# Patient Record
Sex: MALE | Race: OTHER | HISPANIC OR LATINO | URBAN - METROPOLITAN AREA
[De-identification: names, ages, dates, MRNs, and addresses within clinical notes are randomized per-mention and may not be internally consistent; named-entity substitution may affect disease eponyms.]

---

## 2023-09-26 ENCOUNTER — EMERGENCY (EMERGENCY)
Facility: HOSPITAL | Age: 18
LOS: 1 days | Discharge: HOME | End: 2023-09-26
Attending: STUDENT IN AN ORGANIZED HEALTH CARE EDUCATION/TRAINING PROGRAM
Payer: COMMERCIAL

## 2023-09-26 VITALS
RESPIRATION RATE: 18 BRPM | WEIGHT: 186.29 LBS | SYSTOLIC BLOOD PRESSURE: 118 MMHG | OXYGEN SATURATION: 99 % | TEMPERATURE: 98 F | DIASTOLIC BLOOD PRESSURE: 72 MMHG | HEIGHT: 68 IN | HEART RATE: 52 BPM

## 2023-09-26 VITALS
OXYGEN SATURATION: 100 % | DIASTOLIC BLOOD PRESSURE: 68 MMHG | HEART RATE: 68 BPM | RESPIRATION RATE: 20 BRPM | TEMPERATURE: 98 F | SYSTOLIC BLOOD PRESSURE: 118 MMHG

## 2023-09-26 PROCEDURE — 99283 EMERGENCY DEPT VISIT LOW MDM: CPT

## 2023-09-26 PROCEDURE — 73140 X-RAY EXAM OF FINGER(S): CPT | Mod: 26,RT

## 2023-09-26 PROCEDURE — 73140 X-RAY EXAM OF FINGER(S): CPT

## 2023-09-26 PROCEDURE — 99284 EMERGENCY DEPT VISIT MOD MDM: CPT | Mod: 25

## 2023-09-26 PROCEDURE — 29130 APPL FINGER SPLINT STATIC: CPT | Mod: F9

## 2023-09-26 RX ORDER — IBUPROFEN 200 MG
600 TABLET ORAL ONCE
Refills: 0 | Status: COMPLETED | OUTPATIENT
Start: 2023-09-26 | End: 2023-09-26

## 2023-09-26 RX ORDER — ACETAMINOPHEN 500 MG
650 TABLET ORAL ONCE
Refills: 0 | Status: COMPLETED | OUTPATIENT
Start: 2023-09-26 | End: 2023-09-26

## 2023-09-26 RX ADMIN — Medication 650 MILLIGRAM(S): at 21:03

## 2023-09-26 RX ADMIN — Medication 600 MILLIGRAM(S): at 21:03

## 2023-09-26 NOTE — ED PROVIDER NOTE - NSFOLLOWUPINSTRUCTIONS_ED_ALL_ED_FT
- Ibuprofen 600mg every 6 hours as needed for pain.  - Acetaminophen 650mg every 6 hours as needed for pain.  - Please bring all documentation from your ED visit to any related future follow up appointment.  - Please call to schedule follow up appointment with your primary care physician within 24-48 hours.  - Please seek immediate medical attention or return to the ED for any new/worsening, signs/symptoms, or concerns.    Feel better!    Finger Sprain, Adult    A finger sprain is a tear or stretch in a ligament in a finger. Ligaments are tissues that connect bones to each other.    What are the causes?  Finger sprains happen when something makes the bones in the hand move in an abnormal way. They are often caused by a fall or accident.    What increases the risk?  This condition is more likely to develop in people who:    Participate in sports in which it is easy to fall, such as skiing.  Play sports that involve catching an object, such as basketball.  Have poor strength and flexibility.    What are the signs or symptoms?  Symptoms of this condition include:    Pain or tenderness in the finger.  Swelling in the finger.  Bluish appearance to the finger.  Bruising.  Difficulty bending and flexing the finger.    How is this diagnosed?  This condition is diagnosed with an exam of your finger. Your health care provider may do an X-ray to see if any bones are broken or dislocated.    How is this treated?     Treatment for this condition depends on how severe the sprain is. It may involve:    Preventing the finger from moving for a period of time. Your finger may be wrapped in a bandage (dressing), splint, or cast, or your finger may be taped to the fingers beside it (buddy taping).  Keeping the hand raised (elevated) above the level of the heart during rest and sleep.  Medicines for pain.  Exercises to strengthen the finger. These may be recommended when the finger has healed.  Surgery to reconnect the ligament to a bone. This may be done if the ligament was torn all the way.    Follow these instructions at home:      If you have a splint:    Do not put pressure on any part of the splint until it is fully hardened. This may take several hours.  Wear the splint as told by your health care provider. Remove it only as told by your health care provider.  Loosen the splint if your fingers tingle, become numb, or turn cold and blue.  Keep the splint clean.  If the splint is not waterproof:    Do not let it get wet.  Cover it with a watertight covering when you take a bath or a shower.        If you have a cast:    Do not put pressure on any part of the cast until it is fully hardened. This may take several hours.  Do not stick anything inside the cast to scratch your skin. Doing that increases your risk of infection.  Check the skin around the cast every day. Tell your health care provider about any concerns.  You may put lotion on dry skin around the edges of the cast. Do not put lotion on the skin underneath the cast.  Keep the cast clean.  If the cast is not waterproof:    Do not let it get wet.  Cover it with a watertight covering when you take a bath or shower.        Managing pain, stiffness, and swelling    If directed, put ice on the injured area:    If you have a removable splint, remove it as told by your health care provider.  Put ice in a plastic bag.  Place a towel between your skin and the bag or between your cast and the bag.  Leave the ice on for 20 minutes, 2–3 times a day.  Gently move your fingers often to avoid stiffness and to lessen swelling.  Elevate the injured area above the level of your heart while you are sitting or lying down.        Medicines    Take over-the-counter and prescription medicines only as told by your health care provider.  Do not drive or use heavy machinery while taking prescription pain medicine.        General instructions    Keep any dressings dry until your health care provider says they can be removed.  Do exercises as told by your health care provider or physical therapist.  Do not wear rings on your injured finger.  Keep all follow-up visits as told by your health care provider. This is important.    Get help right away if:  Your pain is not controlled with medicine.  Your bruising or swelling gets worse.  Your splint or cast is damaged.  Your finger is numb or blue.  Your finger feels colder to the touch than normal.  You develop a fever.    Summary  A finger sprain is a tear or stretch in a ligament in a finger. Ligaments are tissues that connect bones to each other.  Finger sprains happen when something makes the bones in the hand move in an abnormal way. They are often caused by a fall or accident.  This condition is diagnosed with an exam of your finger. Your health care provider may do an X-ray to see if any bones are broken or dislocated.  Treatment for this condition depends on how severe the sprain is. Treatment may involve wearing a splint or cast. Surgery to reconnect the ligament to a bone may be needed if the ligament was torn all the way.    ADDITIONAL NOTES AND INSTRUCTIONS    Please follow up with your Primary MD in 24-48 hr.  Seek immediate medical care for any new/worsening signs or symptoms.

## 2023-09-26 NOTE — ED PROVIDER NOTE - PATIENT PORTAL LINK FT
You can access the FollowMyHealth Patient Portal offered by [facility] by registering at the following website: [email or web address]. By joining [employer]’s FollowMyHealth portal, you will also be able to view your health information using other applications (apps) compatible with our system.

## 2023-09-26 NOTE — ED PROVIDER NOTE - CLINICAL SUMMARY MEDICAL DECISION MAKING FREE TEXT BOX
19 yo male no PMHx presents to ED c/o right 5th digit injury x2 days ago. 19 yo male no PMHx presents to ED c/o right 5th digit injury x2 days ago. XR negative. Splinted. Medically stable for discharge.

## 2023-09-26 NOTE — ED PROVIDER NOTE - PHYSICAL EXAMINATION
Gen: Nontoxic, well appearing, in NAD.  Skin: Warm and dry as visualized.  Head: NC/AT.  Eyes: PERRLA. EOMI.  Neck: Supple, FROM. Trachea midline.   Resp: No distress.  Cardio: Well perfused.  PV: Capillary refill <2 seconds. 2+ radial pulse.   Ext: No deformities (contrary to triage). +Swelling/ecchymosis to right PIP. MAEx4. FROM.   Neuro: A&Ox3. Sensation intact.   Psych: Normal affect and mood.

## 2023-09-26 NOTE — ED PROVIDER NOTE - ATTENDING APP SHARED VISIT CONTRIBUTION OF CARE
I have personally performed a history and physical examination of the patient and discussed management with the PAM as well as the patient.  I reviewed the PAM's note and agree with the documented findings and plan of care.  I have authored and modified critical sections of the Provider Note, including but not limited to HPI, Physical Exam and MDM.    17 yo male no PMHx presents to ED c/o right 5th digit injury x2 days ago.  Tendon and neurovascular function grossly intact throughout the right hand.  TTP over the PIP and MTP joint.  Independent review of x-ray shows no evidence of fracture or dislocation.  Likely contusion.  Symptomatic control as needed.  Outpatient follow-up.

## 2023-09-26 NOTE — ED PROVIDER NOTE - CARE PROVIDER_API CALL
Mark High  Orthopaedic Surgery  [address] [place] [address]  Phone: [phone]  Fax: [phone]  Follow Up Time:

## 2023-09-26 NOTE — ED PROVIDER NOTE - OBJECTIVE STATEMENT
17 yo male no PMHx presents to ED c/o right 5th digit injury x2 days ago. Slip and fall, hurt finger. Did not self medicate PTA. No further complaints at this time.

## 2023-09-28 DIAGNOSIS — S60.051A CONTUSION OF RIGHT LITTLE FINGER WITHOUT DAMAGE TO NAIL, INITIAL ENCOUNTER: ICD-10-CM

## 2023-09-28 DIAGNOSIS — S69.91XA UNSPECIFIED INJURY OF RIGHT WRIST, HAND AND FINGER(S), INITIAL ENCOUNTER: ICD-10-CM
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
## 2024-07-15 ENCOUNTER — EMERGENCY (EMERGENCY)
Facility: HOSPITAL | Age: 19
LOS: 1 days | Discharge: HOME | End: 2024-07-15
Attending: STUDENT IN AN ORGANIZED HEALTH CARE EDUCATION/TRAINING PROGRAM
Payer: SELF-PAY

## 2024-07-15 VITALS
TEMPERATURE: 99 F | HEART RATE: 56 BPM | RESPIRATION RATE: 18 BRPM | WEIGHT: 171.96 LBS | SYSTOLIC BLOOD PRESSURE: 116 MMHG | OXYGEN SATURATION: 98 % | DIASTOLIC BLOOD PRESSURE: 69 MMHG

## 2024-07-15 PROCEDURE — 99283 EMERGENCY DEPT VISIT LOW MDM: CPT

## 2024-07-15 PROCEDURE — 96372 THER/PROPH/DIAG INJ SC/IM: CPT

## 2024-07-15 PROCEDURE — 99284 EMERGENCY DEPT VISIT MOD MDM: CPT

## 2024-07-15 PROCEDURE — T1013: CPT

## 2024-07-15 RX ORDER — KETOROLAC TROMETHAMINE 30 MG/ML
15 INJECTION, SOLUTION INTRAMUSCULAR ONCE
Refills: 0 | Status: DISCONTINUED | OUTPATIENT
Start: 2024-07-15 | End: 2024-07-15

## 2024-07-15 RX ORDER — METHOCARBAMOL 500 MG
1500 TABLET ORAL ONCE
Refills: 0 | Status: COMPLETED | OUTPATIENT
Start: 2024-07-15 | End: 2024-07-15

## 2024-07-15 RX ORDER — LIDOCAINE HCL 28 MG/G
1 GEL TOPICAL ONCE
Refills: 0 | Status: COMPLETED | OUTPATIENT
Start: 2024-07-15 | End: 2024-07-15

## 2024-07-15 RX ORDER — METHOCARBAMOL 500 MG
2 TABLET ORAL
Qty: 18 | Refills: 0
Start: 2024-07-15 | End: 2024-07-17

## 2024-07-15 RX ADMIN — LIDOCAINE HCL 1 PATCH: 28 GEL TOPICAL at 12:43

## 2024-07-15 RX ADMIN — KETOROLAC TROMETHAMINE 15 MILLIGRAM(S): 30 INJECTION, SOLUTION INTRAMUSCULAR at 12:43

## 2024-07-15 RX ADMIN — Medication 1500 MILLIGRAM(S): at 12:43

## 2024-07-15 NOTE — ED PROVIDER NOTE - MUSCULOSKELETAL, MLM
Spine appears normal, range of motion is not limited. No midline TTP. + b/l trapezius TTP. SILT. FROM of extremities. SILT.

## 2024-07-15 NOTE — ED PROVIDER NOTE - PATIENT PORTAL LINK FT
You can access the FollowMyHealth Patient Portal offered by [facility] by registering at the following website: [email or web address]. By joining [employer]’s FollowMyHealth portal, you will also be able to view your health information using other applications (apps) compatible with our system.

## 2024-07-15 NOTE — ED PROVIDER NOTE - ADDITIONAL NOTES AND INSTRUCTIONS:
Please consider placing Tres Brunner into a less strenuous role at work to accommodate recent upper back injury.

## 2024-07-15 NOTE — ED PROVIDER NOTE - OBJECTIVE STATEMENT
27-year-old male presented to the ED complaining of bilateral trapezius pain x 1 week.  Patient states that he does a lot of labor wrist activity with his arms that he believes causes the pain when he works.  Patient otherwise denies recent injury, numbness/tingling has no other complaints this time.

## 2024-07-15 NOTE — ED ADULT NURSE NOTE - NSFALLUNIVINTERV_ED_ALL_ED
Bed/Stretcher in lowest position, wheels locked, appropriate side rails in place/Call bell, personal items and telephone in reach/Instruct patient to call for assistance before getting out of bed/chair/stretcher/Non-slip footwear applied when patient is off stretcher/[place] to call system/Physically safe environment - no spills, clutter or unnecessary equipment/Purposeful proactive rounding/Room/bathroom lighting operational, light cord in reach

## 2024-07-15 NOTE — ED PROVIDER NOTE - CLINICAL SUMMARY MEDICAL DECISION MAKING FREE TEXT BOX
27-year-old male presented to the ED complaining of bilateral trapezius pain x 1 week.  Patient states that he does a lot of labor wrist activity with his arms that he believes causes the pain when he works. Patient given medication ED reports significant relief presenting symptoms.  Patient stable for discharge.